# Patient Record
Sex: FEMALE | Race: WHITE | NOT HISPANIC OR LATINO | Employment: OTHER | URBAN - METROPOLITAN AREA
[De-identification: names, ages, dates, MRNs, and addresses within clinical notes are randomized per-mention and may not be internally consistent; named-entity substitution may affect disease eponyms.]

---

## 2023-09-01 PROBLEM — M25.532 ARTHRALGIA OF WRIST, LEFT: Status: ACTIVE | Noted: 2023-09-01

## 2023-09-01 PROBLEM — M76.822 POSTERIOR TIBIAL TENDON DYSFUNCTION, LEFT: Status: ACTIVE | Noted: 2023-09-01

## 2023-09-01 RX ORDER — PANTOPRAZOLE SODIUM 40 MG/1
TABLET, DELAYED RELEASE ORAL
COMMUNITY

## 2023-09-01 RX ORDER — ACETAMINOPHEN 500 MG
TABLET ORAL
COMMUNITY

## 2023-09-01 RX ORDER — LOSARTAN POTASSIUM 100 MG/1
TABLET ORAL
COMMUNITY

## 2023-09-01 RX ORDER — LEVOTHYROXINE SODIUM 112 UG/1
TABLET ORAL
COMMUNITY

## 2023-10-10 ENCOUNTER — OFFICE VISIT (OUTPATIENT)
Dept: OBSTETRICS AND GYNECOLOGY | Facility: CLINIC | Age: 68
End: 2023-10-10
Payer: MEDICARE

## 2023-10-10 VITALS
BODY MASS INDEX: 32.69 KG/M2 | WEIGHT: 184.5 LBS | SYSTOLIC BLOOD PRESSURE: 124 MMHG | HEIGHT: 63 IN | DIASTOLIC BLOOD PRESSURE: 80 MMHG

## 2023-10-10 DIAGNOSIS — Z12.31 SCREENING MAMMOGRAM FOR BREAST CANCER: ICD-10-CM

## 2023-10-10 DIAGNOSIS — N39.46 MIXED INCONTINENCE: ICD-10-CM

## 2023-10-10 DIAGNOSIS — Z01.419 WELL WOMAN EXAM WITH ROUTINE GYNECOLOGICAL EXAM: Primary | ICD-10-CM

## 2023-10-10 PROCEDURE — 1159F MED LIST DOCD IN RCRD: CPT | Performed by: NURSE PRACTITIONER

## 2023-10-10 PROCEDURE — G0101 CA SCREEN;PELVIC/BREAST EXAM: HCPCS | Performed by: NURSE PRACTITIONER

## 2023-10-10 PROCEDURE — 1036F TOBACCO NON-USER: CPT | Performed by: NURSE PRACTITIONER

## 2023-10-10 ASSESSMENT — PATIENT HEALTH QUESTIONNAIRE - PHQ9
SUM OF ALL RESPONSES TO PHQ9 QUESTIONS 1 & 2: 0
1. LITTLE INTEREST OR PLEASURE IN DOING THINGS: NOT AT ALL
2. FEELING DOWN, DEPRESSED OR HOPELESS: NOT AT ALL

## 2023-10-10 NOTE — PROGRESS NOTES
HPI: Esme Cortés is a 68 y.o. year old  presenting for annual gyn exam  Seen [facility] prior to here, had rectocele repair 2021    Current concerns: bladder symptoms- hard cough or sneeze will leak, only occas mixed incont. Denies UTI symptoms currently  Pt new to this practice    LMP:  Postmenopausal, menopause age 56yo, no HRT  Irregular bleeding: no  Sexually active: yes, using lubricant  Problems with intercourse: no   STI concerns: no  Last pap: 2019 per pt nml  History of abnormal pap: no  Last mammogram: 2022 normal  Other gyn history: fibroid uterus;  x3, rectocele   OB History    No obstetric history on file.      No past medical history on file.   No past surgical history on file.   Social History    Socioeconomic History      Marital status:       Spouse name: Not on file      Number of children: Not on file      Years of education: Not on file      Highest education level: Not on file    Occupational History      Not on file    Tobacco Use      Smoking status: Never      Smokeless tobacco: Never    Substance and Sexual Activity      Alcohol use: Not on file      Drug use: Not on file      Sexual activity: Not on file    Other Topics      Concerns:        Not on file    Social History Narrative      Not on file    Social Determinants of Health  Financial Resource Strain: Not on file  Food Insecurity: Not on file  Transportation Needs: Not on file  Physical Activity: Not on file  Stress: Not on file  Social Connections: Not on file  Intimate Partner Violence: Not on file  Housing Stability: Not on file   No family history on file.     Current Outpatient Medications:   cholecalciferol (Vitamin D-3) 50 mcg (2,000 unit) capsule, , Disp: , Rfl:   levothyroxine (Synthroid, Levoxyl) 112 mcg tablet, , Disp: , Rfl:   losartan (Cozaar) 100 mg tablet, , Disp: , Rfl:   MULTIVITAMIN ORAL, Take 1 tablet by mouth once daily in the morning., Disp: , Rfl:   pantoprazole (Protonix) 40 mg EC tablet, ,  "Disp: , Rfl:           REVIEW OF SYSTEMS:  Breast. denies masses, nipple discharge  : denies dysuria, hematuria;  incontinence   Gl: denies change in bowel habits, blood in stools, no more leakage after surgery      PHYSICAL EXAM:  Vitals: /80   Ht 1.6 m (5' 3\")   Wt 83.7 kg (184 lb 8 oz)   BMI 32.68 kg/m²   Gen: well appearing woman in NAD  HEENT: normocephalic, thyroid not enlarged, no lymphadenopathy  CV: no m/r/g  Chest: CTAB, no w/r/r  Breast: normal tissue bilaterally without masses, skin changes, lymphadenopathy   Abdomen: soft, nontender, no masses/hernias, liver and spleen not enlarged   Pelvic:  - external genitalia: normal  - vagina: normal  - cervix: normal  - uterus: normal size, nontender  - adnexa: no palpable masses or tenderness  RECTAL: no external hemorrhoids; rectal exam deferred    ASSESSMENT/PLAN :    1) Health maintenance, Well-woman exam.  Pap/HPV up to date and no longer needed  Mammogram ordered  Nutrition, exercise and routine health maintenance exams reviewed.  Calcium/Vitamin D supplementation information provided   Smoking cessation: non-smoker  2) Postmenopausal, still having night sweats, discussed at length  3) STD screening: declines  4) H/o rectocele with repair, now having increased mixed incontinence symptoms, rec eval by UroGyn Dr. Brown and referral ordered  Follow up 2 years or sooner as needed   "

## 2023-10-10 NOTE — PROGRESS NOTES
Pt here today for annual visit  C/O bladder leakage  Lmp menopausal  Last Pap  neg per pt  Last claudia 22 neg  Last Bone Density abn    Declined chaperone  Student Nurse okay to enter

## 2023-10-19 ENCOUNTER — ANCILLARY PROCEDURE (OUTPATIENT)
Dept: RADIOLOGY | Facility: CLINIC | Age: 68
End: 2023-10-19
Payer: MEDICARE

## 2023-10-19 DIAGNOSIS — Z12.31 SCREENING MAMMOGRAM FOR BREAST CANCER: ICD-10-CM

## 2023-10-19 PROCEDURE — 77067 SCR MAMMO BI INCL CAD: CPT

## 2023-10-19 PROCEDURE — 77063 BREAST TOMOSYNTHESIS BI: CPT | Performed by: RADIOLOGY

## 2023-10-19 PROCEDURE — 77067 SCR MAMMO BI INCL CAD: CPT | Performed by: RADIOLOGY

## 2024-02-07 ENCOUNTER — LAB (OUTPATIENT)
Dept: LAB | Facility: LAB | Age: 69
End: 2024-02-07
Payer: MEDICARE

## 2024-02-07 DIAGNOSIS — E03.9 HYPOTHYROIDISM, UNSPECIFIED: Primary | ICD-10-CM

## 2024-02-07 DIAGNOSIS — E78.5 HYPERLIPIDEMIA, UNSPECIFIED: ICD-10-CM

## 2024-02-07 DIAGNOSIS — I10 ESSENTIAL (PRIMARY) HYPERTENSION: ICD-10-CM

## 2024-02-07 LAB
ANION GAP SERPL CALC-SCNC: 12 MMOL/L (ref 10–20)
BUN SERPL-MCNC: 13 MG/DL (ref 6–23)
CALCIUM SERPL-MCNC: 10.3 MG/DL (ref 8.6–10.3)
CHLORIDE SERPL-SCNC: 107 MMOL/L (ref 98–107)
CHOLEST SERPL-MCNC: 218 MG/DL (ref 0–199)
CHOLESTEROL/HDL RATIO: 3.9
CO2 SERPL-SCNC: 25 MMOL/L (ref 21–32)
CREAT SERPL-MCNC: 0.72 MG/DL (ref 0.5–1.05)
EGFRCR SERPLBLD CKD-EPI 2021: >90 ML/MIN/1.73M*2
GLUCOSE SERPL-MCNC: 95 MG/DL (ref 74–99)
HDLC SERPL-MCNC: 56 MG/DL
LDLC SERPL CALC-MCNC: 146 MG/DL
NON HDL CHOLESTEROL: 162 MG/DL (ref 0–149)
POTASSIUM SERPL-SCNC: 4.2 MMOL/L (ref 3.5–5.3)
SODIUM SERPL-SCNC: 140 MMOL/L (ref 136–145)
TRIGL SERPL-MCNC: 78 MG/DL (ref 0–149)
TSH SERPL-ACNC: 1.05 MIU/L (ref 0.44–3.98)
VLDL: 16 MG/DL (ref 0–40)

## 2024-02-07 PROCEDURE — 84443 ASSAY THYROID STIM HORMONE: CPT

## 2024-02-07 PROCEDURE — 80061 LIPID PANEL: CPT

## 2024-02-07 PROCEDURE — 80048 BASIC METABOLIC PNL TOTAL CA: CPT

## 2024-02-07 PROCEDURE — 36415 COLL VENOUS BLD VENIPUNCTURE: CPT

## 2024-06-12 ENCOUNTER — TELEPHONE (OUTPATIENT)
Dept: SURGERY | Facility: HOSPITAL | Age: 69
End: 2024-06-12
Payer: MEDICARE

## 2024-06-12 PROBLEM — I47.20 PAROXYSMAL VENTRICULAR TACHYCARDIA: Status: ACTIVE | Noted: 2024-06-12

## 2024-06-12 PROBLEM — I10 ESSENTIAL (PRIMARY) HYPERTENSION: Status: ACTIVE | Noted: 2021-12-29

## 2024-06-12 PROBLEM — M79.2 NEURITIS: Status: ACTIVE | Noted: 2023-06-21

## 2024-06-12 PROBLEM — M76.822 POSTERIOR TIBIAL TENDON DYSFUNCTION, LEFT: Status: RESOLVED | Noted: 2023-09-01 | Resolved: 2024-06-12

## 2024-06-12 PROBLEM — M25.532 ARTHRALGIA OF WRIST, LEFT: Status: RESOLVED | Noted: 2023-09-01 | Resolved: 2024-06-12

## 2024-06-12 PROBLEM — K44.9 HIATAL HERNIA: Status: ACTIVE | Noted: 2024-06-12

## 2024-06-12 PROBLEM — R32 INCONTINENCE: Status: ACTIVE | Noted: 2024-06-12

## 2024-06-12 PROBLEM — N81.11 MIDLINE CYSTOCELE: Status: ACTIVE | Noted: 2024-06-12

## 2024-06-12 PROBLEM — M25.559 ARTHRALGIA OF HIP: Status: ACTIVE | Noted: 2024-06-12

## 2024-06-12 PROBLEM — I47.29 PAROXYSMAL VENTRICULAR TACHYCARDIA (MULTI): Status: ACTIVE | Noted: 2024-06-12

## 2024-06-12 PROBLEM — M25.521 ELBOW PAIN, RIGHT: Status: ACTIVE | Noted: 2023-06-21

## 2024-06-12 PROBLEM — K62.9 RECTAL DISORDER: Status: ACTIVE | Noted: 2024-06-12

## 2024-06-12 PROBLEM — N95.1 VAGINAL DRYNESS, MENOPAUSAL: Status: ACTIVE | Noted: 2022-08-30

## 2024-06-12 RX ORDER — AMOXICILLIN 500 MG/1
CAPSULE ORAL
COMMUNITY
Start: 2023-09-11

## 2024-06-12 RX ORDER — METOPROLOL SUCCINATE 50 MG/1
TABLET, EXTENDED RELEASE ORAL
COMMUNITY
Start: 2001-12-26

## 2024-06-12 RX ORDER — CALCIUM CARBONATE 600 MG
1200 TABLET ORAL
COMMUNITY
Start: 2021-10-11

## 2024-06-12 NOTE — PROGRESS NOTES
NPV- HIATAL HERNIA    GERD Health Related Quality of Life (HRQL) Questionnaire    Heartburn Questions  1. How bad is the heartburn? Response Scale: 2 = Noticeable, bothersome but not everyday  2. Heartburn when lying down? Response Scale: 0 = No Symptoms  3. Heartburn when standing up? Response Scale: 1 = Noticeable, but no bothersome  4. Heartburn after meals? Response Scale: 1 = Noticeable, but no bothersome  5. Does heartburn change your diet? Response Scale: 0 = No Symptoms  6. Does heartburn wake you from sleep? Response Scale: 0 = No Symptoms    7. Do you have difficulty swallowing? Response Scale: 2 = Noticeable, bothersome but not everyday  8. Do you have pain with swallowing? Response Scale: 0 = No Symptoms  9. Do you have gassy or bloating feelings? Response Scale: 3 = Bothersome daily  10. If you take medication, does this affect your daily life? Response Scale: 0 = No Symptoms    Regurgitation Questions  11. How bad is the regurgitation? Response Scale: 0 = No Symptoms  12. Regurgitation when lying down? Response Scale: 0 = No Symptoms  13. Regurgitation when standing up? Response Scale: 0 = No Symptoms  14. Regurgitation after meals? Response Scale: 0 = No Symptoms  15. Does regurgitation change your diet? Response Scale: 0 = No Symptoms  16. Does regurgitation wake you from sleep? Response Scale: 0 = No Symptoms    Are you currently taking any medications for heartburn or GERD? PPI: Yes, medication: pantoprazole  How satisfied are you with your present condition? Satisfaction: Neutral    Total score (sum questions 1-16): 9  Greatest possible score 80 (worst symptoms).  Lowest possible score 0 (no symptoms).    Heartburn score (sum questions 1-6): 4  Worst heartburn symptoms: 30.  No heartburn symptoms: 0.  Score less than or equal to 12 with each individual question not exceeding 2 indicate heartburn elimination.    Regurgitation score (sum questions 11-16): 0  Worst regurgitation symptoms: 30.  No  regurgitation symptoms: 0.  Score less than or equal to 12 with each individual question not exceeding 2 indicate regurgitation elimination.

## 2024-06-12 NOTE — TELEPHONE ENCOUNTER
Left a detailed message with patient requesting more information pertaining to her hiatal hernia, and where she possible has been scooped at before. I reviewed [facility] with no success.  Gave my direct phone number for return call.

## 2024-06-13 ENCOUNTER — APPOINTMENT (OUTPATIENT)
Dept: SURGERY | Facility: CLINIC | Age: 69
End: 2024-06-13
Payer: MEDICARE

## 2024-06-13 VITALS
HEIGHT: 63 IN | SYSTOLIC BLOOD PRESSURE: 152 MMHG | OXYGEN SATURATION: 97 % | WEIGHT: 182.2 LBS | DIASTOLIC BLOOD PRESSURE: 94 MMHG | RESPIRATION RATE: 17 BRPM | BODY MASS INDEX: 32.28 KG/M2 | TEMPERATURE: 97.8 F | HEART RATE: 84 BPM

## 2024-06-13 DIAGNOSIS — K44.9 HIATAL HERNIA: ICD-10-CM

## 2024-06-13 PROCEDURE — 3077F SYST BP >= 140 MM HG: CPT | Performed by: SURGERY

## 2024-06-13 PROCEDURE — 3080F DIAST BP >= 90 MM HG: CPT | Performed by: SURGERY

## 2024-06-13 PROCEDURE — 99202 OFFICE O/P NEW SF 15 MIN: CPT | Performed by: SURGERY

## 2024-06-13 PROCEDURE — 1126F AMNT PAIN NOTED NONE PRSNT: CPT | Performed by: SURGERY

## 2024-06-13 PROCEDURE — 1036F TOBACCO NON-USER: CPT | Performed by: SURGERY

## 2024-06-13 PROCEDURE — 1159F MED LIST DOCD IN RCRD: CPT | Performed by: SURGERY

## 2024-06-13 ASSESSMENT — PAIN SCALES - GENERAL: PAINLEVEL: 0-NO PAIN

## 2024-06-13 ASSESSMENT — ENCOUNTER SYMPTOMS
CONSTITUTIONAL NEGATIVE: 1
ENDOCRINE NEGATIVE: 1
EYES NEGATIVE: 1
CARDIOVASCULAR NEGATIVE: 1
MUSCULOSKELETAL NEGATIVE: 1
RESPIRATORY NEGATIVE: 1
ABDOMINAL PAIN: 1

## 2024-06-13 NOTE — PROGRESS NOTES
Subjective   Patient ID: Esme Cortés is a 69 y.o. female who presents for No chief complaint on file..  HPI  70 YO F BMI 32 with HTN, restless leg, insomnia, referred for HH.    Last EGD in 2011 with small hiatal hernia. On PPI with good effect. HRQL 9. Used to have epigastric pain with laying down. Much less now on PPI. No dysphagia or emesis.    Review of Systems   Constitutional: Negative.    HENT: Negative.     Eyes: Negative.    Respiratory: Negative.     Cardiovascular: Negative.    Gastrointestinal:  Positive for abdominal pain.   Endocrine: Negative.    Genitourinary: Negative.    Musculoskeletal: Negative.        Objective   Physical Exam  Constitutional:       Appearance: Normal appearance.   HENT:      Head: Atraumatic.      Nose: Nose normal.      Mouth/Throat:      Mouth: Mucous membranes are moist.   Cardiovascular:      Rate and Rhythm: Normal rate and regular rhythm.   Pulmonary:      Effort: Pulmonary effort is normal.      Breath sounds: Normal breath sounds.   Abdominal:      General: There is no distension.      Palpations: Abdomen is soft.      Tenderness: There is no guarding or rebound.   Skin:     General: Skin is warm.   Neurological:      Mental Status: She is alert. Mental status is at baseline.   Psychiatric:         Mood and Affect: Mood normal.         Thought Content: Thought content normal.         Judgment: Judgment normal.         Assessment/Plan   Problem List Items Addressed This Visit             ICD-10-CM       Gastrointestinal and Abdominal    Hiatal hernia K44.9    Relevant Orders    Esophagogastroduodenoscopy (EGD)   Plan to perform EGD and then discuss options. Suspect nonop.         Ever Manjarrez MD PhD 06/13/24 12:11 PM

## 2024-06-14 ENCOUNTER — TELEPHONE (OUTPATIENT)
Dept: SURGERY | Facility: HOSPITAL | Age: 69
End: 2024-06-14
Payer: MEDICARE

## 2024-06-14 NOTE — TELEPHONE ENCOUNTER
Patient returned call and was in agreement to scheduling EGD for July 12.  I informed patient that instructions would be sent to her via [employer].

## 2024-06-14 NOTE — TELEPHONE ENCOUNTER
Left a detailed message with patient along with my direct phone number about scheduling patient for her EGD.  Upon return call will schedule accordingly.

## 2024-06-28 ENCOUNTER — APPOINTMENT (OUTPATIENT)
Dept: CARDIOLOGY | Facility: CLINIC | Age: 69
End: 2024-06-28
Payer: MEDICARE

## 2024-06-28 VITALS
OXYGEN SATURATION: 98 % | SYSTOLIC BLOOD PRESSURE: 150 MMHG | BODY MASS INDEX: 33.35 KG/M2 | HEIGHT: 63 IN | DIASTOLIC BLOOD PRESSURE: 100 MMHG | WEIGHT: 188.2 LBS | HEART RATE: 82 BPM

## 2024-06-28 DIAGNOSIS — R07.89 OTHER CHEST PAIN: ICD-10-CM

## 2024-06-28 DIAGNOSIS — I47.29 PAROXYSMAL VENTRICULAR TACHYCARDIA (MULTI): ICD-10-CM

## 2024-06-28 DIAGNOSIS — I10 ESSENTIAL (PRIMARY) HYPERTENSION: ICD-10-CM

## 2024-06-28 PROCEDURE — 1036F TOBACCO NON-USER: CPT | Performed by: INTERNAL MEDICINE

## 2024-06-28 PROCEDURE — 3080F DIAST BP >= 90 MM HG: CPT | Performed by: INTERNAL MEDICINE

## 2024-06-28 PROCEDURE — 99204 OFFICE O/P NEW MOD 45 MIN: CPT | Performed by: INTERNAL MEDICINE

## 2024-06-28 PROCEDURE — 1159F MED LIST DOCD IN RCRD: CPT | Performed by: INTERNAL MEDICINE

## 2024-06-28 PROCEDURE — 93000 ELECTROCARDIOGRAM COMPLETE: CPT | Performed by: INTERNAL MEDICINE

## 2024-06-28 PROCEDURE — 3077F SYST BP >= 140 MM HG: CPT | Performed by: INTERNAL MEDICINE

## 2024-06-28 NOTE — LETTER
June 28, 2024       No Recipients    Patient: Esme Cortés   YOB: 1955   Date of Visit: 6/28/2024       Dear Dr. Espinal Recipients:    Thank you for referring Esme Cortés to me for evaluation. Below are my notes for this consultation.  If you have questions, please do not hesitate to call me. I look forward to following your patient along with you.       Sincerely,     Liam Mack MD      CC:   No Recipients  ______________________________________________________________________________________     Cardiology New Patient History and Physical    Reason for referral: CP    HPI: Esme Cortés is a 69 y.o.  female who presents today for evaluation of CP.     Patient is a 69-year-old female with a history of hypertension, hiatal hernia, GERD, SVT status post ablation who presents with chest discomfort.  Patient states she began having chest discomfort while ago.  Every few months she gets a sharp discomfort radiating to the back lasting about 30 minutes.  Lying down can sometimes make this better.  She has no symptoms with exertion.  She has no other radiation of the discomfort.  She denies any shortness of breath with the discomfort.  She otherwise denies any palpitations since 2002.  She denies any lightheadedness, syncope, orthopnea, PND, lower extremity edema.    2/7/2024 , , HDL 56, triglycerides 78.  6/28/2024 ECG demonstrates sinus rhythm with first-degree AV block, poor R wave progression.      Past Medical History:   She has a past medical history of Arthralgia of wrist, left (09/01/2023) and Posterior tibial tendon dysfunction, left (09/01/2023).    Surgical History:   She has no past surgical history on file.    Family History:   No family history on file.    Allergies:  Patient has no known allergies.     Social History:   No tobacco use    Prior Cardiovascular Testing (personally reviewed):     Review of Systems:  Review of Systems   All other systems reviewed and  are negative.      Objective     Outpatient Medications:    Current Outpatient Medications:   •  amoxicillin (Amoxil) 500 mg capsule, TAKE 4 CAPSULES BY MOUTH 1 HR BEFORE APPT, Disp: , Rfl:   •  calcium carbonate 600 mg calcium (1,500 mg) tablet, 1,200 mg., Disp: , Rfl:   •  cholecalciferol (Vitamin D-3) 50 mcg (2,000 unit) capsule, , Disp: , Rfl:   •  levothyroxine (Synthroid, Levoxyl) 112 mcg tablet, , Disp: , Rfl:   •  losartan (Cozaar) 100 mg tablet, , Disp: , Rfl:   •  metoprolol succinate XL (Toprol XL) 50 mg 24 hr tablet, 1 tablet in AM and 2 tablets in PM, Disp: , Rfl:   •  pantoprazole (Protonix) 40 mg EC tablet, , Disp: , Rfl:      Last Recorded Vitals  There were no vitals taken for this visit.    Physical Exam:  Physical Exam  Vitals reviewed.   Constitutional:       Appearance: Normal appearance.   HENT:      Head: Normocephalic and atraumatic.      Mouth/Throat:      Mouth: Mucous membranes are moist.      Pharynx: Oropharynx is clear.   Eyes:      Extraocular Movements: Extraocular movements intact.      Conjunctiva/sclera: Conjunctivae normal.   Cardiovascular:      Rate and Rhythm: Normal rate and regular rhythm.      Pulses: Normal pulses.      Heart sounds: Normal heart sounds.   Pulmonary:      Effort: Pulmonary effort is normal.      Breath sounds: Normal breath sounds.   Abdominal:      General: Bowel sounds are normal.      Palpations: Abdomen is soft.   Musculoskeletal:         General: No swelling.      Cervical back: Neck supple.   Skin:     General: Skin is warm and dry.   Neurological:      General: No focal deficit present.      Mental Status: She is alert.   Psychiatric:         Mood and Affect: Mood normal.         Behavior: Behavior normal.         Lab Review:    Lab Results   Component Value Date    GLUCOSE 95 02/07/2024    CALCIUM 10.3 02/07/2024     02/07/2024    K 4.2 02/07/2024    CO2 25 02/07/2024     02/07/2024    BUN 13 02/07/2024    CREATININE 0.72 02/07/2024  "      No results found for: \"WBC\", \"HGB\", \"HCT\", \"MCV\", \"PLT\"    Lab Results   Component Value Date    CHOL 218 (H) 02/07/2024    CHOL 213 (H) 02/15/2023    CHOL 240 (H) 12/29/2021     Lab Results   Component Value Date    HDL 56.0 02/07/2024    HDL 58.8 02/15/2023    HDL 59.6 12/29/2021     Lab Results   Component Value Date    LDLCALC 146 (H) 02/07/2024     Lab Results   Component Value Date    TRIG 78 02/07/2024    TRIG 71 02/15/2023    TRIG 80 12/29/2021     No components found for: \"CHOLHDL\"    No results found for: \"BNP\"    Lab Results   Component Value Date    TSH 1.05 02/07/2024       Assessment:   Patient with atypical chest pain that occurs primarily with rest.  No exertional component.  EKG is benign.  Has been going on for a long time.  Patient also has a hiatal hernia as well as reflux disease.  She is due for endoscopy next month.  Suspect her discomfort is likely GI related.    Will check an echocardiogram to ensure her LV function is normal.  Given her dyslipidemia, would recommend calcium score to better prognosticate her goal LDL level.    Patient will follow-up with us in 6 weeks or sooner if she has more problems.    Liam Mack MD    "

## 2024-06-28 NOTE — LETTER
June 28, 2024     Singh Giraldo MD  [address] [facility] [address]    Patient: Esme Cortés   YOB: 1955   Date of Visit: 6/28/2024       Dear Dr. Singh Giraldo MD:    Thank you for referring Esme Cortés to me for evaluation. Below are my notes for this consultation.  If you have questions, please do not hesitate to call me. I look forward to following your patient along with you.       Sincerely,     Liam Mack MD      CC: No Recipients  ______________________________________________________________________________________     Cardiology New Patient History and Physical    Reason for referral: CP    HPI: Esme Cortés is a 69 y.o.  female who presents today for evaluation of CP.     Patient is a 69-year-old female with a history of hypertension, hiatal hernia, GERD, SVT status post ablation who presents with chest discomfort.  Patient states she began having chest discomfort while ago.  Every few months she gets a sharp discomfort radiating to the back lasting about 30 minutes.  Lying down can sometimes make this better.  She has no symptoms with exertion.  She has no other radiation of the discomfort.  She denies any shortness of breath with the discomfort.  She otherwise denies any palpitations since 2002.  She denies any lightheadedness, syncope, orthopnea, PND, lower extremity edema.    2/7/2024 , , HDL 56, triglycerides 78.  6/28/2024 ECG demonstrates sinus rhythm with first-degree AV block, poor R wave progression.      Past Medical History:   She has a past medical history of Arthralgia of wrist, left (09/01/2023) and Posterior tibial tendon dysfunction, left (09/01/2023).    Surgical History:   She has no past surgical history on file.    Family History:   No family history on file.    Allergies:  Patient has no known allergies.     Social History:   No tobacco use    Prior Cardiovascular Testing (personally reviewed):     Review of Systems:  Review of  Systems   All other systems reviewed and are negative.      Objective     Outpatient Medications:    Current Outpatient Medications:   •  amoxicillin (Amoxil) 500 mg capsule, TAKE 4 CAPSULES BY MOUTH 1 HR BEFORE APPT, Disp: , Rfl:   •  calcium carbonate 600 mg calcium (1,500 mg) tablet, 1,200 mg., Disp: , Rfl:   •  cholecalciferol (Vitamin D-3) 50 mcg (2,000 unit) capsule, , Disp: , Rfl:   •  levothyroxine (Synthroid, Levoxyl) 112 mcg tablet, , Disp: , Rfl:   •  losartan (Cozaar) 100 mg tablet, , Disp: , Rfl:   •  metoprolol succinate XL (Toprol XL) 50 mg 24 hr tablet, 1 tablet in AM and 2 tablets in PM, Disp: , Rfl:   •  pantoprazole (Protonix) 40 mg EC tablet, , Disp: , Rfl:      Last Recorded Vitals  There were no vitals taken for this visit.    Physical Exam:  Physical Exam  Vitals reviewed.   Constitutional:       Appearance: Normal appearance.   HENT:      Head: Normocephalic and atraumatic.      Mouth/Throat:      Mouth: Mucous membranes are moist.      Pharynx: Oropharynx is clear.   Eyes:      Extraocular Movements: Extraocular movements intact.      Conjunctiva/sclera: Conjunctivae normal.   Cardiovascular:      Rate and Rhythm: Normal rate and regular rhythm.      Pulses: Normal pulses.      Heart sounds: Normal heart sounds.   Pulmonary:      Effort: Pulmonary effort is normal.      Breath sounds: Normal breath sounds.   Abdominal:      General: Bowel sounds are normal.      Palpations: Abdomen is soft.   Musculoskeletal:         General: No swelling.      Cervical back: Neck supple.   Skin:     General: Skin is warm and dry.   Neurological:      General: No focal deficit present.      Mental Status: She is alert.   Psychiatric:         Mood and Affect: Mood normal.         Behavior: Behavior normal.         Lab Review:    Lab Results   Component Value Date    GLUCOSE 95 02/07/2024    CALCIUM 10.3 02/07/2024     02/07/2024    K 4.2 02/07/2024    CO2 25 02/07/2024     02/07/2024    BUN 13  "02/07/2024    CREATININE 0.72 02/07/2024       No results found for: \"WBC\", \"HGB\", \"HCT\", \"MCV\", \"PLT\"    Lab Results   Component Value Date    CHOL 218 (H) 02/07/2024    CHOL 213 (H) 02/15/2023    CHOL 240 (H) 12/29/2021     Lab Results   Component Value Date    HDL 56.0 02/07/2024    HDL 58.8 02/15/2023    HDL 59.6 12/29/2021     Lab Results   Component Value Date    LDLCALC 146 (H) 02/07/2024     Lab Results   Component Value Date    TRIG 78 02/07/2024    TRIG 71 02/15/2023    TRIG 80 12/29/2021     No components found for: \"CHOLHDL\"    No results found for: \"BNP\"    Lab Results   Component Value Date    TSH 1.05 02/07/2024       Assessment:   Patient with atypical chest pain that occurs primarily with rest.  No exertional component.  EKG is benign.  Has been going on for a long time.  Patient also has a hiatal hernia as well as reflux disease.  She is due for endoscopy next month.  Suspect her discomfort is likely GI related.    Will check an echocardiogram to ensure her LV function is normal.  Given her dyslipidemia, would recommend calcium score to better prognosticate her goal LDL level.    Patient will follow-up with us in 6 weeks or sooner if she has more problems.    Liam Mack MD      "

## 2024-06-28 NOTE — PROGRESS NOTES
Cardiology New Patient History and Physical    Reason for referral: CP    HPI: Esme Cortés is a 69 y.o.  female who presents today for evaluation of CP.     Patient is a 69-year-old female with a history of hypertension, hiatal hernia, GERD, SVT status post ablation who presents with chest discomfort.  Patient states she began having chest discomfort while ago.  Every few months she gets a sharp discomfort radiating to the back lasting about 30 minutes.  Lying down can sometimes make this better.  She has no symptoms with exertion.  She has no other radiation of the discomfort.  She denies any shortness of breath with the discomfort.  She otherwise denies any palpitations since 2002.  She denies any lightheadedness, syncope, orthopnea, PND, lower extremity edema.    2/7/2024 , , HDL 56, triglycerides 78.  6/28/2024 ECG demonstrates sinus rhythm with first-degree AV block, poor R wave progression.      Past Medical History:   She has a past medical history of Arthralgia of wrist, left (09/01/2023) and Posterior tibial tendon dysfunction, left (09/01/2023).    Surgical History:   She has no past surgical history on file.    Family History:   No family history on file.    Allergies:  Patient has no known allergies.     Social History:   No tobacco use    Prior Cardiovascular Testing (personally reviewed):     Review of Systems:  Review of Systems   All other systems reviewed and are negative.      Objective     Outpatient Medications:    Current Outpatient Medications:     amoxicillin (Amoxil) 500 mg capsule, TAKE 4 CAPSULES BY MOUTH 1 HR BEFORE APPT, Disp: , Rfl:     calcium carbonate 600 mg calcium (1,500 mg) tablet, 1,200 mg., Disp: , Rfl:     cholecalciferol (Vitamin D-3) 50 mcg (2,000 unit) capsule, , Disp: , Rfl:     levothyroxine (Synthroid, Levoxyl) 112 mcg tablet, , Disp: , Rfl:     losartan (Cozaar) 100 mg tablet, , Disp: , Rfl:     metoprolol succinate XL (Toprol XL) 50 mg 24 hr tablet, 1  "tablet in AM and 2 tablets in PM, Disp: , Rfl:     pantoprazole (Protonix) 40 mg EC tablet, , Disp: , Rfl:      Last Recorded Vitals  There were no vitals taken for this visit.    Physical Exam:  Physical Exam  Vitals reviewed.   Constitutional:       Appearance: Normal appearance.   HENT:      Head: Normocephalic and atraumatic.      Mouth/Throat:      Mouth: Mucous membranes are moist.      Pharynx: Oropharynx is clear.   Eyes:      Extraocular Movements: Extraocular movements intact.      Conjunctiva/sclera: Conjunctivae normal.   Cardiovascular:      Rate and Rhythm: Normal rate and regular rhythm.      Pulses: Normal pulses.      Heart sounds: Normal heart sounds.   Pulmonary:      Effort: Pulmonary effort is normal.      Breath sounds: Normal breath sounds.   Abdominal:      General: Bowel sounds are normal.      Palpations: Abdomen is soft.   Musculoskeletal:         General: No swelling.      Cervical back: Neck supple.   Skin:     General: Skin is warm and dry.   Neurological:      General: No focal deficit present.      Mental Status: She is alert.   Psychiatric:         Mood and Affect: Mood normal.         Behavior: Behavior normal.         Lab Review:    Lab Results   Component Value Date    GLUCOSE 95 02/07/2024    CALCIUM 10.3 02/07/2024     02/07/2024    K 4.2 02/07/2024    CO2 25 02/07/2024     02/07/2024    BUN 13 02/07/2024    CREATININE 0.72 02/07/2024       No results found for: \"WBC\", \"HGB\", \"HCT\", \"MCV\", \"PLT\"    Lab Results   Component Value Date    CHOL 218 (H) 02/07/2024    CHOL 213 (H) 02/15/2023    CHOL 240 (H) 12/29/2021     Lab Results   Component Value Date    HDL 56.0 02/07/2024    HDL 58.8 02/15/2023    HDL 59.6 12/29/2021     Lab Results   Component Value Date    LDLCALC 146 (H) 02/07/2024     Lab Results   Component Value Date    TRIG 78 02/07/2024    TRIG 71 02/15/2023    TRIG 80 12/29/2021     No components found for: \"CHOLHDL\"    No results found for: \"BNP\"    Lab " Results   Component Value Date    TSH 1.05 02/07/2024       Assessment:   Patient with atypical chest pain that occurs primarily with rest.  No exertional component.  EKG is benign.  Has been going on for a long time.  Patient also has a hiatal hernia as well as reflux disease.  She is due for endoscopy next month.  Suspect her discomfort is likely GI related.    Will check an echocardiogram to ensure her LV function is normal.  Given her dyslipidemia, would recommend calcium score to better prognosticate her goal LDL level.    Patient will follow-up with us in 6 weeks or sooner if she has more problems.    Liam Mack MD

## 2024-07-12 ENCOUNTER — HOSPITAL ENCOUNTER (OUTPATIENT)
Dept: GASTROENTEROLOGY | Facility: HOSPITAL | Age: 69
Discharge: HOME | End: 2024-07-12
Payer: MEDICARE

## 2024-07-12 ENCOUNTER — ANESTHESIA EVENT (OUTPATIENT)
Dept: GASTROENTEROLOGY | Facility: HOSPITAL | Age: 69
End: 2024-07-12
Payer: MEDICARE

## 2024-07-12 ENCOUNTER — ANESTHESIA (OUTPATIENT)
Dept: GASTROENTEROLOGY | Facility: HOSPITAL | Age: 69
End: 2024-07-12
Payer: MEDICARE

## 2024-07-12 VITALS
TEMPERATURE: 97.2 F | DIASTOLIC BLOOD PRESSURE: 91 MMHG | RESPIRATION RATE: 16 BRPM | BODY MASS INDEX: 33.31 KG/M2 | WEIGHT: 188 LBS | OXYGEN SATURATION: 99 % | HEIGHT: 63 IN | HEART RATE: 67 BPM | SYSTOLIC BLOOD PRESSURE: 148 MMHG

## 2024-07-12 DIAGNOSIS — K44.9 HIATAL HERNIA: ICD-10-CM

## 2024-07-12 PROCEDURE — 43239 EGD BIOPSY SINGLE/MULTIPLE: CPT | Performed by: SURGERY

## 2024-07-12 PROCEDURE — 7100000009 HC PHASE TWO TIME - INITIAL BASE CHARGE: Performed by: SURGERY

## 2024-07-12 PROCEDURE — 2500000004 HC RX 250 GENERAL PHARMACY W/ HCPCS (ALT 636 FOR OP/ED): Performed by: SURGERY

## 2024-07-12 PROCEDURE — 3700000001 HC GENERAL ANESTHESIA TIME - INITIAL BASE CHARGE: Performed by: SURGERY

## 2024-07-12 PROCEDURE — 7100000010 HC PHASE TWO TIME - EACH INCREMENTAL 1 MINUTE: Performed by: SURGERY

## 2024-07-12 PROCEDURE — 3700000002 HC GENERAL ANESTHESIA TIME - EACH INCREMENTAL 1 MINUTE: Performed by: SURGERY

## 2024-07-12 PROCEDURE — 2500000004 HC RX 250 GENERAL PHARMACY W/ HCPCS (ALT 636 FOR OP/ED): Performed by: ANESTHESIOLOGIST ASSISTANT

## 2024-07-12 RX ORDER — SODIUM CHLORIDE, SODIUM LACTATE, POTASSIUM CHLORIDE, CALCIUM CHLORIDE 600; 310; 30; 20 MG/100ML; MG/100ML; MG/100ML; MG/100ML
20 INJECTION, SOLUTION INTRAVENOUS CONTINUOUS
Status: DISCONTINUED | OUTPATIENT
Start: 2024-07-12 | End: 2024-07-13 | Stop reason: HOSPADM

## 2024-07-12 RX ORDER — PROPOFOL 10 MG/ML
INJECTION, EMULSION INTRAVENOUS CONTINUOUS PRN
Status: DISCONTINUED | OUTPATIENT
Start: 2024-07-12 | End: 2024-07-12

## 2024-07-12 SDOH — HEALTH STABILITY: MENTAL HEALTH: CURRENT SMOKER: 0

## 2024-07-12 ASSESSMENT — PAIN SCALES - GENERAL
PAINLEVEL_OUTOF10: 0 - NO PAIN

## 2024-07-12 ASSESSMENT — COLUMBIA-SUICIDE SEVERITY RATING SCALE - C-SSRS
1. IN THE PAST MONTH, HAVE YOU WISHED YOU WERE DEAD OR WISHED YOU COULD GO TO SLEEP AND NOT WAKE UP?: NO
2. HAVE YOU ACTUALLY HAD ANY THOUGHTS OF KILLING YOURSELF?: NO
6. HAVE YOU EVER DONE ANYTHING, STARTED TO DO ANYTHING, OR PREPARED TO DO ANYTHING TO END YOUR LIFE?: NO

## 2024-07-12 ASSESSMENT — PAIN - FUNCTIONAL ASSESSMENT: PAIN_FUNCTIONAL_ASSESSMENT: 0-10

## 2024-07-12 NOTE — ANESTHESIA PREPROCEDURE EVALUATION
Patient: Esme Cortés    Procedure Information       Date/Time: 07/12/24 1230    Scheduled providers: Ever Manjarrez MD PhD; Liam Pandya MD    Procedure: EGD    Location: [facility]            Relevant Problems   Anesthesia (within normal limits)      Cardiac   (+) Essential (primary) hypertension   (+) Paroxysmal ventricular tachycardia (Multi)      GI   (+) Hiatal hernia      Endocrine   (+) Hypothyroidism   (+) Multinodular thyroid       Clinical information reviewed:   Tobacco  Allergies  Meds   Med Hx  Surg Hx   Fam Hx  Soc Hx        NPO Detail:  NPO/Void Status  Date of Last Liquid: 07/12/24  Time of Last Liquid: 0000  Date of Last Solid: 07/12/24  Time of Last Solid: 0000         Physical Exam    Airway  Mallampati: II  TM distance: >3 FB  Neck ROM: full     Cardiovascular - normal exam     Dental - normal exam     Pulmonary - normal exam     Abdominal            Anesthesia Plan    History of general anesthesia?: yes  History of complications of general anesthesia?: no    ASA 2     MAC     The patient is not a current smoker.    intravenous induction   Anesthetic plan and risks discussed with patient.    Plan discussed with CAA.

## 2024-07-12 NOTE — ANESTHESIA POSTPROCEDURE EVALUATION
Patient: Esme Cortés    Procedure Summary       Date: 07/12/24 Room / Location: [facility]    Anesthesia Start: 1209 Anesthesia Stop: 1234    Procedure: EGD Diagnosis: Hiatal hernia    Scheduled Providers: Ever Manjarrez MD PhD; Liam Pandya MD Responsible Provider: Liam Pandya MD    Anesthesia Type: MAC ASA Status: 2            Anesthesia Type: MAC    Vitals Value Taken Time   /89 07/12/24 1245   Temp 36.2 °C (97.2 °F) 07/12/24 1232   Pulse 64 07/12/24 1245   Resp 16 07/12/24 1245   SpO2 99 % 07/12/24 1245       Anesthesia Post Evaluation    Patient location during evaluation: PACU  Patient participation: complete - patient participated  Level of consciousness: awake and alert  Pain management: adequate  Airway patency: patent  Cardiovascular status: acceptable  Respiratory status: acceptable  Hydration status: acceptable  Postoperative Nausea and Vomiting: none        No notable events documented.

## 2024-07-23 LAB
LABORATORY COMMENT REPORT: NORMAL
PATH REPORT.FINAL DX SPEC: NORMAL
PATH REPORT.GROSS SPEC: NORMAL
PATH REPORT.RELEVANT HX SPEC: NORMAL
PATH REPORT.TOTAL CANCER: NORMAL

## 2024-07-24 PROBLEM — K62.9 RECTAL DISORDER: Status: RESOLVED | Noted: 2024-06-12 | Resolved: 2024-07-24

## 2024-07-24 PROBLEM — M79.2 NEURITIS: Status: RESOLVED | Noted: 2023-06-21 | Resolved: 2024-07-24

## 2024-07-24 PROBLEM — R32 INCONTINENCE: Status: RESOLVED | Noted: 2024-06-12 | Resolved: 2024-07-24

## 2024-07-24 PROBLEM — N81.11 MIDLINE CYSTOCELE: Status: RESOLVED | Noted: 2024-06-12 | Resolved: 2024-07-24

## 2024-07-24 PROBLEM — I47.20 PAROXYSMAL VENTRICULAR TACHYCARDIA: Status: RESOLVED | Noted: 2024-06-12 | Resolved: 2024-07-24

## 2024-07-24 PROBLEM — I47.29 PAROXYSMAL VENTRICULAR TACHYCARDIA (MULTI): Status: RESOLVED | Noted: 2024-06-12 | Resolved: 2024-07-24

## 2024-07-24 PROBLEM — M25.521 ELBOW PAIN, RIGHT: Status: RESOLVED | Noted: 2023-06-21 | Resolved: 2024-07-24

## 2024-07-25 ENCOUNTER — APPOINTMENT (OUTPATIENT)
Dept: SURGERY | Facility: CLINIC | Age: 69
End: 2024-07-25
Payer: MEDICARE

## 2024-07-25 VITALS
RESPIRATION RATE: 16 BRPM | OXYGEN SATURATION: 97 % | HEIGHT: 63 IN | HEART RATE: 70 BPM | BODY MASS INDEX: 33.31 KG/M2 | WEIGHT: 188 LBS | DIASTOLIC BLOOD PRESSURE: 93 MMHG | TEMPERATURE: 98.3 F | SYSTOLIC BLOOD PRESSURE: 158 MMHG

## 2024-07-25 DIAGNOSIS — K44.9 HIATAL HERNIA: ICD-10-CM

## 2024-07-25 DIAGNOSIS — Z71.9 ENCOUNTER FOR CONSULTATION: ICD-10-CM

## 2024-07-25 PROCEDURE — 3077F SYST BP >= 140 MM HG: CPT | Performed by: SURGERY

## 2024-07-25 PROCEDURE — 3008F BODY MASS INDEX DOCD: CPT | Performed by: SURGERY

## 2024-07-25 PROCEDURE — 99213 OFFICE O/P EST LOW 20 MIN: CPT | Performed by: SURGERY

## 2024-07-25 PROCEDURE — 1126F AMNT PAIN NOTED NONE PRSNT: CPT | Performed by: SURGERY

## 2024-07-25 PROCEDURE — 1036F TOBACCO NON-USER: CPT | Performed by: SURGERY

## 2024-07-25 PROCEDURE — 3080F DIAST BP >= 90 MM HG: CPT | Performed by: SURGERY

## 2024-07-25 PROCEDURE — 1159F MED LIST DOCD IN RCRD: CPT | Performed by: SURGERY

## 2024-07-25 ASSESSMENT — ENCOUNTER SYMPTOMS
CONSTITUTIONAL NEGATIVE: 1
ABDOMINAL PAIN: 1
MUSCULOSKELETAL NEGATIVE: 1
EYES NEGATIVE: 1
RESPIRATORY NEGATIVE: 1
ENDOCRINE NEGATIVE: 1
CARDIOVASCULAR NEGATIVE: 1

## 2024-07-25 ASSESSMENT — PAIN SCALES - GENERAL: PAINLEVEL: 0-NO PAIN

## 2024-07-25 NOTE — PROGRESS NOTES
Subjective   Patient ID: Esme Cortés is a 69 y.o. female who presents for Hernia (Hiatal hernia, surgical consult).  HPI  70 YO F BMI 32 with HTN, restless leg, insomnia, referred for HH.    Last EGD in 2011 with small hiatal hernia. On PPI with good effect. HRQL 9. Used to have epigastric pain with laying down. Much less now on PPI. No dysphagia or emesis.    EGD 07/12/2024 with 4 cm + hiatal hernia, Hill Grade IV, minimal esophagitis. Path negative.    Returned to clinic 07/25/2024. Continued epigastric pain and bloating, worse with laying down, lasts about 30 minutes. On PPI. Will schedule repair in fall or winter outside of boating season.    Review of Systems   Constitutional: Negative.    HENT: Negative.     Eyes: Negative.    Respiratory: Negative.     Cardiovascular: Negative.    Gastrointestinal:  Positive for abdominal pain.   Endocrine: Negative.    Genitourinary: Negative.    Musculoskeletal: Negative.        Objective   Physical Exam  Constitutional:       Appearance: Normal appearance.   HENT:      Head: Atraumatic.      Nose: Nose normal.      Mouth/Throat:      Mouth: Mucous membranes are moist.   Cardiovascular:      Rate and Rhythm: Normal rate and regular rhythm.   Pulmonary:      Effort: Pulmonary effort is normal.      Breath sounds: Normal breath sounds.   Abdominal:      General: There is no distension.      Palpations: Abdomen is soft.      Tenderness: There is no guarding or rebound.   Skin:     General: Skin is warm.   Neurological:      Mental Status: She is alert. Mental status is at baseline.   Psychiatric:         Mood and Affect: Mood normal.         Thought Content: Thought content normal.         Judgment: Judgment normal.         Assessment/Plan   Problem List Items Addressed This Visit             ICD-10-CM       Gastrointestinal and Abdominal    Hiatal hernia K44.9     Other Visit Diagnoses         Codes    Encounter for consultation     Z71.9        Return in end of October  to discuss HHR. Probably no fundoplication.         Ever Manjarrez MD PhD 07/25/24 3:21 PM

## 2024-08-07 ENCOUNTER — HOSPITAL ENCOUNTER (OUTPATIENT)
Dept: RADIOLOGY | Facility: CLINIC | Age: 69
Discharge: HOME | End: 2024-08-07
Payer: MEDICARE

## 2024-08-07 DIAGNOSIS — R07.89 OTHER CHEST PAIN: ICD-10-CM

## 2024-08-07 PROCEDURE — 75571 CT HRT W/O DYE W/CA TEST: CPT

## 2024-08-14 ENCOUNTER — TELEPHONE (OUTPATIENT)
Dept: CARDIOLOGY | Facility: CLINIC | Age: 69
End: 2024-08-14
Payer: MEDICARE

## 2024-08-14 DIAGNOSIS — E78.5 HYPERLIPIDEMIA, UNSPECIFIED HYPERLIPIDEMIA TYPE: ICD-10-CM

## 2024-08-14 RX ORDER — ATORVASTATIN CALCIUM 40 MG/1
40 TABLET, FILM COATED ORAL DAILY
Qty: 90 TABLET | Refills: 3 | Status: SHIPPED | OUTPATIENT
Start: 2024-08-14

## 2024-08-14 NOTE — TELEPHONE ENCOUNTER
Dr Mack reviewed pt's recent CT calcium test, would like her to start atorvastatin 40mg once daily and recheck lipid panel in 6 mos. Pt notified and agreeable. Pended atorvastatin to Dr IVAN, lipid panel also ordered. Pt is currently scheduled for echo and Dr IVAN OV on 8/20.

## 2024-08-20 ENCOUNTER — APPOINTMENT (OUTPATIENT)
Dept: CARDIOLOGY | Facility: CLINIC | Age: 69
End: 2024-08-20
Payer: MEDICARE

## 2024-08-20 ENCOUNTER — HOSPITAL ENCOUNTER (OUTPATIENT)
Dept: CARDIOLOGY | Facility: CLINIC | Age: 69
Discharge: HOME | End: 2024-08-20
Payer: MEDICARE

## 2024-08-20 VITALS
SYSTOLIC BLOOD PRESSURE: 158 MMHG | BODY MASS INDEX: 33.31 KG/M2 | HEIGHT: 63 IN | WEIGHT: 188 LBS | DIASTOLIC BLOOD PRESSURE: 93 MMHG

## 2024-08-20 VITALS
DIASTOLIC BLOOD PRESSURE: 98 MMHG | HEART RATE: 78 BPM | BODY MASS INDEX: 33.56 KG/M2 | SYSTOLIC BLOOD PRESSURE: 182 MMHG | OXYGEN SATURATION: 97 % | WEIGHT: 189.4 LBS | HEIGHT: 63 IN

## 2024-08-20 DIAGNOSIS — K44.9 HIATAL HERNIA: ICD-10-CM

## 2024-08-20 DIAGNOSIS — R07.9 CHEST PAIN, UNSPECIFIED: ICD-10-CM

## 2024-08-20 DIAGNOSIS — I10 ESSENTIAL (PRIMARY) HYPERTENSION: Primary | ICD-10-CM

## 2024-08-20 DIAGNOSIS — R07.89 OTHER CHEST PAIN: ICD-10-CM

## 2024-08-20 DIAGNOSIS — I25.10 CORONARY ARTERY DISEASE INVOLVING NATIVE CORONARY ARTERY OF NATIVE HEART WITHOUT ANGINA PECTORIS: ICD-10-CM

## 2024-08-20 LAB
AORTIC VALVE MEAN GRADIENT: 5.1 MMHG
AORTIC VALVE PEAK VELOCITY: 1.65 M/S
AV PEAK GRADIENT: 10.9 MMHG
AVA (PEAK VEL): 2.75 CM2
AVA (VTI): 2.91 CM2
EJECTION FRACTION APICAL 4 CHAMBER: 61.9
EJECTION FRACTION: 63 %
LEFT ATRIUM VOLUME AREA LENGTH INDEX BSA: 32.6 ML/M2
LEFT VENTRICLE INTERNAL DIMENSION DIASTOLE: 4.43 CM (ref 3.5–6)
LEFT VENTRICULAR OUTFLOW TRACT DIAMETER: 2.16 CM
RIGHT VENTRICLE FREE WALL PEAK S': 1.3 CM/S
RIGHT VENTRICLE PEAK SYSTOLIC PRESSURE: 29.2 MMHG
TRICUSPID ANNULAR PLANE SYSTOLIC EXCURSION: 2.5 CM

## 2024-08-20 PROCEDURE — 3077F SYST BP >= 140 MM HG: CPT | Performed by: INTERNAL MEDICINE

## 2024-08-20 PROCEDURE — 1159F MED LIST DOCD IN RCRD: CPT | Performed by: INTERNAL MEDICINE

## 2024-08-20 PROCEDURE — 99214 OFFICE O/P EST MOD 30 MIN: CPT | Performed by: INTERNAL MEDICINE

## 2024-08-20 PROCEDURE — 3008F BODY MASS INDEX DOCD: CPT | Performed by: INTERNAL MEDICINE

## 2024-08-20 PROCEDURE — 93306 TTE W/DOPPLER COMPLETE: CPT | Performed by: INTERNAL MEDICINE

## 2024-08-20 PROCEDURE — 1036F TOBACCO NON-USER: CPT | Performed by: INTERNAL MEDICINE

## 2024-08-20 PROCEDURE — 3080F DIAST BP >= 90 MM HG: CPT | Performed by: INTERNAL MEDICINE

## 2024-08-20 PROCEDURE — 93306 TTE W/DOPPLER COMPLETE: CPT

## 2024-08-20 NOTE — LETTER
August 20, 2024     No Recipients    Patient: Esme Cortés   YOB: 1955   Date of Visit: 8/20/2024       Dear Dr. Espinal Recipients:    Thank you for referring Esme Cortés to me for evaluation. Below are my notes for this consultation.  If you have questions, please do not hesitate to call me. I look forward to following your patient along with you.       Sincerely,     Liam Mack MD      CC: No Recipients  ______________________________________________________________________________________        [facility] Cardiology Outpatient Follow-up Visit     Reason for referral: CP     HPI: Esme Cortés is a 69 y.o.  female who presents today for evaluation of CP.      Patient is a 69-year-old female with a history of hypertension, hiatal hernia, GERD, SVT status post ablation who presents with chest discomfort.  Patient states she began having chest discomfort a while ago.  Every few months she gets a sharp discomfort radiating to the back lasting about 30 minutes.  Lying down can sometimes make this better.  She has no symptoms with exertion.  She has no other radiation of the discomfort.  She denies any shortness of breath with the discomfort.  She otherwise denies any palpitations since 2002.  She denies any lightheadedness, syncope, orthopnea, PND, lower extremity edema.     2/7/2024 , , HDL 56, triglycerides 78.  6/28/2024 ECG demonstrates sinus rhythm with first-degree AV block, poor R wave progression.  8/7/2024 coronary calcium score 143, all in the LAD.  8/20/2024 echo: Ejection fraction 63%, mild LVH, impaired relaxation, mildly dilated ascending aorta.    Past Medical History:   She has a past medical history of Arthralgia of wrist, left (09/01/2023), Elbow pain, right (06/21/2023), Incontinence (06/12/2024), Midline cystocele (06/12/2024), Multinodular thyroid (05/08/2012), Neuritis (06/21/2023), Paroxysmal ventricular tachycardia (Multi) (06/12/2024), Posterior tibial  "tendon dysfunction, left (09/01/2023), and Rectal disorder (06/12/2024).    Surgical History:   She has no past surgical history on file.    Family History:   No family history on file.    Allergies:  Patient has no known allergies.     Social History:   No tobacco use     Prior Cardiovascular Testing (Personally Reviewed):     Review of Systems:  Review of Systems   All other systems reviewed and are negative.      Outpatient Medications:    Current Outpatient Medications:   •  amoxicillin (Amoxil) 500 mg capsule, TAKE 4 CAPSULES BY MOUTH 1 HR BEFORE APPT, Disp: , Rfl:   •  atorvastatin (Lipitor) 40 mg tablet, Take 1 tablet (40 mg) by mouth once daily., Disp: 90 tablet, Rfl: 3  •  calcium carbonate 600 mg calcium (1,500 mg) tablet, 1,200 mg., Disp: , Rfl:   •  cholecalciferol (Vitamin D-3) 50 mcg (2,000 unit) capsule, , Disp: , Rfl:   •  levothyroxine (Synthroid, Levoxyl) 112 mcg tablet, , Disp: , Rfl:   •  losartan (Cozaar) 100 mg tablet, , Disp: , Rfl:   •  pantoprazole (Protonix) 40 mg EC tablet, , Disp: , Rfl:      Last Recorded Vitals  BP (!) 182/98 (BP Location: Right arm, Patient Position: Sitting, BP Cuff Size: Adult)   Pulse 78   Ht 1.6 m (5' 3\")   Wt 85.9 kg (189 lb 6.4 oz)   SpO2 97%   BMI 33.55 kg/m²     Physical Exam:    Physical Exam  Vitals reviewed.   Constitutional:       Appearance: Normal appearance.   HENT:      Head: Normocephalic and atraumatic.      Mouth/Throat:      Mouth: Mucous membranes are moist.      Pharynx: Oropharynx is clear.   Eyes:      Extraocular Movements: Extraocular movements intact.      Conjunctiva/sclera: Conjunctivae normal.   Cardiovascular:      Rate and Rhythm: Normal rate and regular rhythm.      Pulses: Normal pulses.      Heart sounds: Normal heart sounds.   Pulmonary:      Effort: Pulmonary effort is normal.      Breath sounds: Normal breath sounds.   Abdominal:      General: Bowel sounds are normal.      Palpations: Abdomen is soft.   Musculoskeletal:        " " General: No swelling.      Cervical back: Neck supple.   Skin:     General: Skin is warm and dry.   Neurological:      General: No focal deficit present.      Mental Status: She is alert.   Psychiatric:         Mood and Affect: Mood normal.         Behavior: Behavior normal.         Lab/Radiology/Diagnostic Review:    Labs    Lab Results   Component Value Date    GLUCOSE 95 02/07/2024    CALCIUM 10.3 02/07/2024     02/07/2024    K 4.2 02/07/2024    CO2 25 02/07/2024     02/07/2024    BUN 13 02/07/2024    CREATININE 0.72 02/07/2024       No results found for: \"WBC\", \"HGB\", \"HCT\", \"MCV\", \"PLT\"    Lab Results   Component Value Date    CHOL 218 (H) 02/07/2024    CHOL 213 (H) 02/15/2023    CHOL 240 (H) 12/29/2021     Lab Results   Component Value Date    HDL 56.0 02/07/2024    HDL 58.8 02/15/2023    HDL 59.6 12/29/2021     Lab Results   Component Value Date    LDLCALC 146 (H) 02/07/2024     Lab Results   Component Value Date    TRIG 78 02/07/2024    TRIG 71 02/15/2023    TRIG 80 12/29/2021     No components found for: \"CHOLHDL\"    No results found for: \"BNP\"    Lab Results   Component Value Date    TSH 1.05 02/07/2024       Assessment:   Patient will recently underwent endoscopy and was found to have hiatal hernia.    Patient states her blood pressure at home runs normal with her cuff.  She has been hypertensive at every visit.  She has mildly dilated ascending aorta on her echocardiogram.  Will have her log her blood pressure every day and come back in for a blood pressure cuff correlation next week with our nurse.  If the patient remains hypertensive or if there is discordance, will consider starting amlodipine in addition to the losartan.     Patient with coronary artery disease with a coronary calcium score of 143.  Do not believe the patient is symptomatic from a coronary standpoint.  Patient's goal LDL is less than 70.    Patient will come in for blood pressure check in 1 week.     Patient will " follow-up with us in 3 months or sooner if she has more problems.    Liam Mack MD

## 2024-08-20 NOTE — PROGRESS NOTES
[facility] Cardiology Outpatient Follow-up Visit     Reason for referral: CP     HPI: Esme Cortés is a 69 y.o.  female who presents today for evaluation of CP.      Patient is a 69-year-old female with a history of hypertension, hiatal hernia, GERD, SVT status post ablation who presents with chest discomfort.  Patient states she began having chest discomfort a while ago.  Every few months she gets a sharp discomfort radiating to the back lasting about 30 minutes.  Lying down can sometimes make this better.  She has no symptoms with exertion.  She has no other radiation of the discomfort.  She denies any shortness of breath with the discomfort.  She otherwise denies any palpitations since 2002.  She denies any lightheadedness, syncope, orthopnea, PND, lower extremity edema.     2/7/2024 , , HDL 56, triglycerides 78.  6/28/2024 ECG demonstrates sinus rhythm with first-degree AV block, poor R wave progression.  8/7/2024 coronary calcium score 143, all in the LAD.  8/20/2024 echo: Ejection fraction 63%, mild LVH, impaired relaxation, mildly dilated ascending aorta.    Past Medical History:   She has a past medical history of Arthralgia of wrist, left (09/01/2023), Elbow pain, right (06/21/2023), Incontinence (06/12/2024), Midline cystocele (06/12/2024), Multinodular thyroid (05/08/2012), Neuritis (06/21/2023), Paroxysmal ventricular tachycardia (Multi) (06/12/2024), Posterior tibial tendon dysfunction, left (09/01/2023), and Rectal disorder (06/12/2024).    Surgical History:   She has no past surgical history on file.    Family History:   No family history on file.    Allergies:  Patient has no known allergies.     Social History:   No tobacco use     Prior Cardiovascular Testing (Personally Reviewed):     Review of Systems:  Review of Systems   All other systems reviewed and are negative.      Outpatient Medications:    Current Outpatient Medications:     amoxicillin (Amoxil) 500 mg capsule, TAKE 4  "CAPSULES BY MOUTH 1 HR BEFORE APPT, Disp: , Rfl:     atorvastatin (Lipitor) 40 mg tablet, Take 1 tablet (40 mg) by mouth once daily., Disp: 90 tablet, Rfl: 3    calcium carbonate 600 mg calcium (1,500 mg) tablet, 1,200 mg., Disp: , Rfl:     cholecalciferol (Vitamin D-3) 50 mcg (2,000 unit) capsule, , Disp: , Rfl:     levothyroxine (Synthroid, Levoxyl) 112 mcg tablet, , Disp: , Rfl:     losartan (Cozaar) 100 mg tablet, , Disp: , Rfl:     pantoprazole (Protonix) 40 mg EC tablet, , Disp: , Rfl:      Last Recorded Vitals  BP (!) 182/98 (BP Location: Right arm, Patient Position: Sitting, BP Cuff Size: Adult)   Pulse 78   Ht 1.6 m (5' 3\")   Wt 85.9 kg (189 lb 6.4 oz)   SpO2 97%   BMI 33.55 kg/m²     Physical Exam:    Physical Exam  Vitals reviewed.   Constitutional:       Appearance: Normal appearance.   HENT:      Head: Normocephalic and atraumatic.      Mouth/Throat:      Mouth: Mucous membranes are moist.      Pharynx: Oropharynx is clear.   Eyes:      Extraocular Movements: Extraocular movements intact.      Conjunctiva/sclera: Conjunctivae normal.   Cardiovascular:      Rate and Rhythm: Normal rate and regular rhythm.      Pulses: Normal pulses.      Heart sounds: Normal heart sounds.   Pulmonary:      Effort: Pulmonary effort is normal.      Breath sounds: Normal breath sounds.   Abdominal:      General: Bowel sounds are normal.      Palpations: Abdomen is soft.   Musculoskeletal:         General: No swelling.      Cervical back: Neck supple.   Skin:     General: Skin is warm and dry.   Neurological:      General: No focal deficit present.      Mental Status: She is alert.   Psychiatric:         Mood and Affect: Mood normal.         Behavior: Behavior normal.         Lab/Radiology/Diagnostic Review:    Labs    Lab Results   Component Value Date    GLUCOSE 95 02/07/2024    CALCIUM 10.3 02/07/2024     02/07/2024    K 4.2 02/07/2024    CO2 25 02/07/2024     02/07/2024    BUN 13 02/07/2024    CREATININE " "0.72 02/07/2024       No results found for: \"WBC\", \"HGB\", \"HCT\", \"MCV\", \"PLT\"    Lab Results   Component Value Date    CHOL 218 (H) 02/07/2024    CHOL 213 (H) 02/15/2023    CHOL 240 (H) 12/29/2021     Lab Results   Component Value Date    HDL 56.0 02/07/2024    HDL 58.8 02/15/2023    HDL 59.6 12/29/2021     Lab Results   Component Value Date    LDLCALC 146 (H) 02/07/2024     Lab Results   Component Value Date    TRIG 78 02/07/2024    TRIG 71 02/15/2023    TRIG 80 12/29/2021     No components found for: \"CHOLHDL\"    No results found for: \"BNP\"    Lab Results   Component Value Date    TSH 1.05 02/07/2024       Assessment:   Patient will recently underwent endoscopy and was found to have hiatal hernia.    Patient states her blood pressure at home runs normal with her cuff.  She has been hypertensive at every visit.  She has mildly dilated ascending aorta on her echocardiogram.  Will have her log her blood pressure every day and come back in for a blood pressure cuff correlation next week with our nurse.  If the patient remains hypertensive or if there is discordance, will consider starting amlodipine in addition to the losartan.     Patient with coronary artery disease with a coronary calcium score of 143.  Do not believe the patient is symptomatic from a coronary standpoint.  Patient's goal LDL is less than 70.    Patient will come in for blood pressure check in 1 week.     Patient will follow-up with us in 3 months or sooner if she has more problems.    Liam Mack MD      "

## 2024-08-20 NOTE — LETTER
August 20, 2024     Singh Giraldo MD  [address] [facility] [address]    Patient: Esme Cortés   YOB: 1955   Date of Visit: 8/20/2024       Dear Dr. Singh Giraldo MD:    Thank you for referring Esme Cortés to me for evaluation. Below are my notes for this consultation.  If you have questions, please do not hesitate to call me. I look forward to following your patient along with you.       Sincerely,     Liam Mack MD      CC: No Recipients  ______________________________________________________________________________________        [facility] Cardiology Outpatient Follow-up Visit     Reason for referral: CP     HPI: Esme Cortés is a 69 y.o.  female who presents today for evaluation of CP.      Patient is a 69-year-old female with a history of hypertension, hiatal hernia, GERD, SVT status post ablation who presents with chest discomfort.  Patient states she began having chest discomfort a while ago.  Every few months she gets a sharp discomfort radiating to the back lasting about 30 minutes.  Lying down can sometimes make this better.  She has no symptoms with exertion.  She has no other radiation of the discomfort.  She denies any shortness of breath with the discomfort.  She otherwise denies any palpitations since 2002.  She denies any lightheadedness, syncope, orthopnea, PND, lower extremity edema.     2/7/2024 , , HDL 56, triglycerides 78.  6/28/2024 ECG demonstrates sinus rhythm with first-degree AV block, poor R wave progression.  8/7/2024 coronary calcium score 143, all in the LAD.  8/20/2024 echo: Ejection fraction 63%, mild LVH, impaired relaxation, mildly dilated ascending aorta.    Past Medical History:   She has a past medical history of Arthralgia of wrist, left (09/01/2023), Elbow pain, right (06/21/2023), Incontinence (06/12/2024), Midline cystocele (06/12/2024), Multinodular thyroid (05/08/2012), Neuritis (06/21/2023), Paroxysmal ventricular  "tachycardia (Multi) (06/12/2024), Posterior tibial tendon dysfunction, left (09/01/2023), and Rectal disorder (06/12/2024).    Surgical History:   She has no past surgical history on file.    Family History:   No family history on file.    Allergies:  Patient has no known allergies.     Social History:   No tobacco use     Prior Cardiovascular Testing (Personally Reviewed):     Review of Systems:  Review of Systems   All other systems reviewed and are negative.      Outpatient Medications:    Current Outpatient Medications:   •  amoxicillin (Amoxil) 500 mg capsule, TAKE 4 CAPSULES BY MOUTH 1 HR BEFORE APPT, Disp: , Rfl:   •  atorvastatin (Lipitor) 40 mg tablet, Take 1 tablet (40 mg) by mouth once daily., Disp: 90 tablet, Rfl: 3  •  calcium carbonate 600 mg calcium (1,500 mg) tablet, 1,200 mg., Disp: , Rfl:   •  cholecalciferol (Vitamin D-3) 50 mcg (2,000 unit) capsule, , Disp: , Rfl:   •  levothyroxine (Synthroid, Levoxyl) 112 mcg tablet, , Disp: , Rfl:   •  losartan (Cozaar) 100 mg tablet, , Disp: , Rfl:   •  pantoprazole (Protonix) 40 mg EC tablet, , Disp: , Rfl:      Last Recorded Vitals  BP (!) 182/98 (BP Location: Right arm, Patient Position: Sitting, BP Cuff Size: Adult)   Pulse 78   Ht 1.6 m (5' 3\")   Wt 85.9 kg (189 lb 6.4 oz)   SpO2 97%   BMI 33.55 kg/m²     Physical Exam:    Physical Exam  Vitals reviewed.   Constitutional:       Appearance: Normal appearance.   HENT:      Head: Normocephalic and atraumatic.      Mouth/Throat:      Mouth: Mucous membranes are moist.      Pharynx: Oropharynx is clear.   Eyes:      Extraocular Movements: Extraocular movements intact.      Conjunctiva/sclera: Conjunctivae normal.   Cardiovascular:      Rate and Rhythm: Normal rate and regular rhythm.      Pulses: Normal pulses.      Heart sounds: Normal heart sounds.   Pulmonary:      Effort: Pulmonary effort is normal.      Breath sounds: Normal breath sounds.   Abdominal:      General: Bowel sounds are normal.      " "Palpations: Abdomen is soft.   Musculoskeletal:         General: No swelling.      Cervical back: Neck supple.   Skin:     General: Skin is warm and dry.   Neurological:      General: No focal deficit present.      Mental Status: She is alert.   Psychiatric:         Mood and Affect: Mood normal.         Behavior: Behavior normal.         Lab/Radiology/Diagnostic Review:    Labs    Lab Results   Component Value Date    GLUCOSE 95 02/07/2024    CALCIUM 10.3 02/07/2024     02/07/2024    K 4.2 02/07/2024    CO2 25 02/07/2024     02/07/2024    BUN 13 02/07/2024    CREATININE 0.72 02/07/2024       No results found for: \"WBC\", \"HGB\", \"HCT\", \"MCV\", \"PLT\"    Lab Results   Component Value Date    CHOL 218 (H) 02/07/2024    CHOL 213 (H) 02/15/2023    CHOL 240 (H) 12/29/2021     Lab Results   Component Value Date    HDL 56.0 02/07/2024    HDL 58.8 02/15/2023    HDL 59.6 12/29/2021     Lab Results   Component Value Date    LDLCALC 146 (H) 02/07/2024     Lab Results   Component Value Date    TRIG 78 02/07/2024    TRIG 71 02/15/2023    TRIG 80 12/29/2021     No components found for: \"CHOLHDL\"    No results found for: \"BNP\"    Lab Results   Component Value Date    TSH 1.05 02/07/2024       Assessment:   Patient will recently underwent endoscopy and was found to have hiatal hernia.    Patient states her blood pressure at home runs normal with her cuff.  She has been hypertensive at every visit.  She has mildly dilated ascending aorta on her echocardiogram.  Will have her log her blood pressure every day and come back in for a blood pressure cuff correlation next week with our nurse.  If the patient remains hypertensive or if there is discordance, will consider starting amlodipine in addition to the losartan.     Patient with coronary artery disease with a coronary calcium score of 143.  Do not believe the patient is symptomatic from a coronary standpoint.  Patient's goal LDL is less than 70.    Patient will come in for " blood pressure check in 1 week.     Patient will follow-up with us in 3 months or sooner if she has more problems.    Liam Mack MD

## 2024-08-26 ENCOUNTER — CLINICAL SUPPORT (OUTPATIENT)
Dept: CARDIOLOGY | Facility: CLINIC | Age: 69
End: 2024-08-26
Payer: MEDICARE

## 2024-08-26 NOTE — PROGRESS NOTES
Pt here for 1 week BP check, and to compare BP with automatic cuff from home.   Manual BP - 132/82  Automatic home cuff - 138/81 HR 72.     Home BP lo/19 - 128/82   - 108/74   - 116/70   - 114/73   - 113/75   - 135/76   - 128/80

## 2024-08-26 NOTE — PROGRESS NOTES
"Per Dr. Mack: \"Looks fine. No changes to BP meds.     PP \"    Spoke to pt and made aware. Instructed to call office with any questions/concerns.  "

## 2024-11-08 ENCOUNTER — APPOINTMENT (OUTPATIENT)
Dept: CARDIOLOGY | Facility: CLINIC | Age: 69
End: 2024-11-08
Payer: MEDICARE

## 2024-12-12 ENCOUNTER — HOSPITAL ENCOUNTER (OUTPATIENT)
Dept: RADIOLOGY | Facility: CLINIC | Age: 69
Discharge: HOME | End: 2024-12-12
Payer: MEDICARE

## 2024-12-12 VITALS — BODY MASS INDEX: 33.55 KG/M2 | WEIGHT: 189.38 LBS | HEIGHT: 63 IN

## 2024-12-12 DIAGNOSIS — Z12.31 SCREENING MAMMOGRAM FOR BREAST CANCER: ICD-10-CM

## 2024-12-12 PROCEDURE — 77067 SCR MAMMO BI INCL CAD: CPT

## 2024-12-12 PROCEDURE — 77067 SCR MAMMO BI INCL CAD: CPT | Performed by: RADIOLOGY

## 2024-12-12 PROCEDURE — 77063 BREAST TOMOSYNTHESIS BI: CPT | Performed by: RADIOLOGY

## 2025-01-07 ENCOUNTER — APPOINTMENT (OUTPATIENT)
Dept: CARDIOLOGY | Facility: CLINIC | Age: 70
End: 2025-01-07
Payer: MEDICARE

## 2025-08-20 DIAGNOSIS — E78.5 HYPERLIPIDEMIA, UNSPECIFIED HYPERLIPIDEMIA TYPE: ICD-10-CM

## 2025-08-20 RX ORDER — ATORVASTATIN CALCIUM 40 MG/1
40 TABLET, FILM COATED ORAL DAILY
Qty: 90 TABLET | Refills: 3 | Status: SHIPPED | OUTPATIENT
Start: 2025-08-20